# Patient Record
(demographics unavailable — no encounter records)

---

## 2025-05-05 NOTE — PHYSICAL EXAM
[Well developed] : well developed [Dysmorphic] : no dysmorphic [Labored breathing] : non- labored breathing [Rigid] : not rigid [Mass] : no mass [Limited limb movement] : no limited limb movement [Rashes] : no rashes [Labial adhesions] : no labial adhesions

## 2025-05-05 NOTE — HISTORY OF PRESENT ILLNESS
[TextBox_4] : DESTINEY presents today for evaluation of urinary frequency and abdominal pain.   Urine culture (4/28/25) negative.  She told her mom about it about 2 months ago.  It was every time she peed, but not as much recently She felt discomfort more on her right abdomen No hematuria Typically voids 7 x per day She does void when at school Claims to have a BM QD-QOD

## 2025-05-05 NOTE — ASSESSMENT
[FreeTextEntry1] :       DESTINEY HEARN is a 14 year girl who seen in consultation today       DESTINEY a history consistent with bladder and bowel dysfunction (BBD).   Destiney is a 14 year old female with intermittent discomfort with urination Today's physical exam is normal   We checked a Post void residual and that was 0mL   but she did have a fair amount of stool on US today.   Her symptoms may be due to intermittent constipation that leads to bladder spasms          The goal is a soft, easy daily bowel movement.          I recommend spending 10-15 minutes each day sitting on the toilet and trying to pass a bowel movement.          If needed, family should add more fiber and water to diet to regulate bowel movements.  If that is not effective, they would benefit from adding a stool softener like Miralax   We will also check a flow/EMG to see if biofeedback is warranted.